# Patient Record
Sex: MALE | Race: WHITE | URBAN - METROPOLITAN AREA
[De-identification: names, ages, dates, MRNs, and addresses within clinical notes are randomized per-mention and may not be internally consistent; named-entity substitution may affect disease eponyms.]

---

## 2024-05-09 ENCOUNTER — TELEPHONE (OUTPATIENT)
Dept: FAMILY MEDICINE CLINIC | Facility: CLINIC | Age: 34
End: 2024-05-09

## 2024-05-09 NOTE — TELEPHONE ENCOUNTER
Shayan Parks (wife) called and is a patient of yours and would like her  to be as well. Is that okay?

## 2024-07-31 ENCOUNTER — TELEPHONE (OUTPATIENT)
Dept: FAMILY MEDICINE CLINIC | Facility: CLINIC | Age: 34
End: 2024-07-31

## 2024-07-31 DIAGNOSIS — Z00.00 ROUTINE GENERAL MEDICAL EXAMINATION AT A HEALTH CARE FACILITY: Primary | ICD-10-CM

## 2024-08-06 ENCOUNTER — OFFICE VISIT (OUTPATIENT)
Dept: FAMILY MEDICINE CLINIC | Facility: CLINIC | Age: 34
End: 2024-08-06
Payer: COMMERCIAL

## 2024-08-06 VITALS
DIASTOLIC BLOOD PRESSURE: 66 MMHG | WEIGHT: 193 LBS | RESPIRATION RATE: 16 BRPM | SYSTOLIC BLOOD PRESSURE: 106 MMHG | BODY MASS INDEX: 25.58 KG/M2 | OXYGEN SATURATION: 100 % | HEART RATE: 71 BPM | HEIGHT: 73 IN

## 2024-08-06 DIAGNOSIS — Q66.6: ICD-10-CM

## 2024-08-06 DIAGNOSIS — Z23 NEED FOR DIPHTHERIA-TETANUS-PERTUSSIS (TDAP) VACCINE: ICD-10-CM

## 2024-08-06 DIAGNOSIS — Z01.84 IMMUNITY STATUS TESTING: ICD-10-CM

## 2024-08-06 DIAGNOSIS — Z00.00 ROUTINE PHYSICAL EXAMINATION: Primary | ICD-10-CM

## 2024-08-06 PROCEDURE — 3008F BODY MASS INDEX DOCD: CPT | Performed by: FAMILY MEDICINE

## 2024-08-06 PROCEDURE — 90715 TDAP VACCINE 7 YRS/> IM: CPT | Performed by: FAMILY MEDICINE

## 2024-08-06 PROCEDURE — 3074F SYST BP LT 130 MM HG: CPT | Performed by: FAMILY MEDICINE

## 2024-08-06 PROCEDURE — 99385 PREV VISIT NEW AGE 18-39: CPT | Performed by: FAMILY MEDICINE

## 2024-08-06 PROCEDURE — 3078F DIAST BP <80 MM HG: CPT | Performed by: FAMILY MEDICINE

## 2024-08-06 PROCEDURE — 90471 IMMUNIZATION ADMIN: CPT | Performed by: FAMILY MEDICINE

## 2024-08-06 RX ORDER — ALBUTEROL SULFATE 90 UG/1
2 AEROSOL, METERED RESPIRATORY (INHALATION) EVERY 6 HOURS PRN
Qty: 8 G | Refills: 0 | Status: SHIPPED | OUTPATIENT
Start: 2024-08-06

## 2024-08-06 NOTE — PATIENT INSTRUCTIONS
Health Maintenance    Return to office (or other lab) for fasting blood work    Health and Safety   Eat healthy well balanced diet - majority should be lean protein and vegetables  Get at least 150 min of exercise per week (30 min/5 days)  Wear sunscreen - SPF 30 or higher and reapply every 80min.   Wear seat belts and drive safely.  Schedule regular appointments with dentist.  Schedule yearly eye exam if you wear glasses/contacts.    Vaccinations   Yearly Flu Vaccine recommended for everyone over the age of 6 months   Tetanus, Diptheria and Pertussis vaccine should be given to adults every 7-10 years.  Adults 50+ are recommended to get shingles vaccine, Shingrix (2 doses  by 2-6 months).  Covid-19 vaccine is recommended. It is safe and effective at decreasing the risk of disease and complications (including need for mechanical ventilation and death)  Pneumonia vaccine (Prevnar 20) is recommended for all adults 65+ and adults under age 65 with chronic medical conditions like lung disease/asthma, diabetes, heart disease.    Colon Cancer screening  The American Cancer Society recommends screening adults 45 and over

## 2024-08-06 NOTE — PROGRESS NOTES
Chief Complaint   Patient presents with    Establish Care    Well Adult    Ankle Pain     Right ankle, pt states he fell a month ago, states was swollen, resolved     Back Pain     Wants to discuss       HPI:  Wellness   Exercise: Yes  Drinks water: Yes  Eat variety of fruits & vegetables, lean meats: Yes  Issues with sleep: No  Regular dental exams: Yes  Change in size/consistency of stool: No  Change in appearance of mole: No      [place] Suicide Severity Rating Scale Screener   In what setting is the screener performed?: an office visit  1. Have you wished you were dead or wished you could go to sleep and not wake up? (past 30 days): No  2. Have you actually had any thoughts of killing yourself? (past 30 days): No  6. Have you ever done anything, started to do anything, or prepared to do anything to end your life? (lifetime): No  Score and Suggested Actions - Office Visit: No Risk  Score and Intervention  Score and Suggested Actions - Office Visit: No Risk    CAGE Alcohol Screening       8/6/2024     3:01 PM   CAGE Flowsheet   Have you ever felt you should Cut down on your drinking? 1   Have people Annoyed you by criticizing your drinking? 0   Have you ever felt bad or Guilty about your drinking? 1   Have you ever had a drink first thing in the morning to steady your nerves or to get rid of a hangover (Eye opener)? 0   Total Score: Item responses on the CAGE are scored 0 or 1, with a higher score an indication of alcohol 2   Total Score: Item responses on the CAGE are scored 0 or 1, with a higher score an indication of alcohol 2 - Clinically Significant        Depression Screening (PHQ-2/PHQ-9): Over the LAST 2 WEEKS   Little interest or pleasure in doing things: Several days    Feeling down, depressed, or hopeless: Not at all    PHQ-2 SCORE: 1        Also wants to discuss     Back pain   Saw chiropractor - getting better   Had rib misaligment from tightness     Ankle sprain   Improving          Review of  Systems   Review of Systems   Constitutional:  Negative for chills, fever and malaise/fatigue.   HENT:  Negative for congestion, ear pain and sore throat.    Eyes:  Negative for blurred vision, double vision and pain.   Respiratory:  Negative for cough, shortness of breath and wheezing.    Cardiovascular:  Negative for chest pain, palpitations and leg swelling.   Gastrointestinal:  Negative for abdominal pain, blood in stool, constipation, diarrhea, melena, nausea and vomiting.   Genitourinary:  Negative for dysuria, flank pain and frequency.   Musculoskeletal:  Negative for myalgias and neck pain.   Skin:  Negative for itching and rash.   Neurological:  Negative for dizziness, weakness and headaches.   Endo/Heme/Allergies:  Negative for environmental allergies and polydipsia. Does not bruise/bleed easily.   Psychiatric/Behavioral:  Negative for depression. The patient is not nervous/anxious and does not have insomnia.        There is no problem list on file for this patient.      Past Medical History:    Asthma (HCC)     Past Surgical History:   Procedure Laterality Date    Other surgical history      left index finger, removal of bone    [place] teeth removed      all 4     Family History   Problem Relation Age of Onset    Depression Mother     Anxiety Mother     Cancer Father         lung    Heart Attack Father 55    Thyroid Disorder Father     Cancer Paternal Grandmother 65        hodgkin lymphoma    Thyroid Disorder Paternal Grandmother     Dementia Paternal Grandmother      Social History     Socioeconomic History    Marital status: Single   Tobacco Use    Smoking status: Former     Current packs/day: 0.00     Types: Cigarettes     Quit date:      Years since quittin.6    Smokeless tobacco: Never   Vaping Use    Vaping status: Every Day    Substances: Nicotine    Devices: Disposable   Substance and Sexual Activity    Alcohol use: Yes     Alcohol/week: 20.0 standard drinks of alcohol     Types: 10 Cans  of beer, 10 Shots of liquor per week    Drug use: Never       Current Outpatient Medications   Medication Sig Dispense Refill    Albuterol Sulfate 108 (90 Base) MCG/ACT Inhalation Aerosol Powder, Breath Activated Inhale into the lungs.      albuterol 108 (90 Base) MCG/ACT Inhalation Aero Soln Inhale 2 puffs into the lungs every 6 (six) hours as needed for Wheezing. 8 g 0       Allergies   Allergen Reactions    Seasonal OTHER (SEE COMMENTS)     Itching eyes, runny nose       Immunizations:   Immunization History   Administered Date(s) Administered    >=3 YRS TRI  MULTIDOSE VIAL (24518) FLU CLINIC 09/05/2017    Covid-19 Vaccine Pfizer 30 mcg/0.3 ml 03/26/2021, 04/16/2021    TDAP 07/13/2012   Pended Date(s) Pended    TDAP 08/06/2024       Physical Exam  /66   Pulse 71   Resp 16   Ht 6' 1\" (1.854 m)   Wt 193 lb (87.5 kg)   SpO2 100%   BMI 25.46 kg/m²   Physical Exam  Constitutional:       General: He is not in acute distress.     Appearance: Normal appearance.   HENT:      Right Ear: Tympanic membrane normal.      Left Ear: Tympanic membrane normal.      Mouth/Throat:      Mouth: Mucous membranes are moist.      Pharynx: Oropharynx is clear. No posterior oropharyngeal erythema.   Eyes:      Extraocular Movements: Extraocular movements intact.      Conjunctiva/sclera: Conjunctivae normal.      Pupils: Pupils are equal, round, and reactive to light.   Cardiovascular:      Rate and Rhythm: Normal rate and regular rhythm.      Pulses: Normal pulses.      Heart sounds: Normal heart sounds.   Pulmonary:      Effort: Pulmonary effort is normal.      Breath sounds: Normal breath sounds. No wheezing or rhonchi.   Abdominal:      General: Abdomen is flat. Bowel sounds are normal.      Palpations: Abdomen is soft.      Tenderness: There is no abdominal tenderness. There is no guarding.   Musculoskeletal:         General: No swelling, tenderness, deformity or signs of injury.      Cervical back: Neck supple.    Lymphadenopathy:      Cervical: No cervical adenopathy.   Skin:     General: Skin is warm and dry.      Findings: No rash.   Neurological:      General: No focal deficit present.      Mental Status: He is alert and oriented to person, place, and time.      Motor: No weakness.   Psychiatric:         Mood and Affect: Mood normal.         Behavior: Behavior normal.         Thought Content: Thought content normal.         A/P:    1. Routine physical examination    2. Need for diphtheria-tetanus-pertussis (Tdap) vaccine  - TETANUS, DIPHTHERIA TOXOIDS AND ACELLULAR PERTUSIS VACCINE (TDAP), >7 YEARS, IM USE    3. Immunity status testing  - Hepatitis B Surface Antibody [E]; Future    4. Congenital hallux valgus  Refer to podiatry when ready          Health Maintenance   Topic Date Due    Annual Physical  Never done    DTaP,Tdap,and Td Vaccines (2 - Td or Tdap) 07/13/2022    COVID-19 Vaccine (3 - 2023-24 season) 09/01/2023    Tobacco Cessation Counseling  Never done    Influenza Vaccine (1) 10/01/2024    Annual Depression Screening  Completed    Pneumococcal Vaccine: Birth to 64yrs  Aged Out       Meds & Refills for this Visit:  Requested Prescriptions     Signed Prescriptions Disp Refills    albuterol 108 (90 Base) MCG/ACT Inhalation Aero Soln 8 g 0     Sig: Inhale 2 puffs into the lungs every 6 (six) hours as needed for Wheezing.       Imaging & Consults:  TETANUS, DIPHTHERIA TOXOIDS AND ACELLULAR PERTUSIS VACCINE (TDAP), >7 YEARS, IM USE      Return in about 1 year (around 8/6/2025) for annual physical.      Patient Instructions   Health Maintenance    Return to office (or other lab) for fasting blood work    Health and Safety   Eat healthy well balanced diet - majority should be lean protein and vegetables  Get at least 150 min of exercise per week (30 min/5 days)  Wear sunscreen - SPF 30 or higher and reapply every 80min.   Wear seat belts and drive safely.  Schedule regular appointments with dentist.  Schedule yearly  eye exam if you wear glasses/contacts.    Vaccinations   Yearly Flu Vaccine recommended for everyone over the age of 6 months   Tetanus, Diptheria and Pertussis vaccine should be given to adults every 7-10 years.  Adults 50+ are recommended to get shingles vaccine, Shingrix (2 doses  by 2-6 months).  Covid-19 vaccine is recommended. It is safe and effective at decreasing the risk of disease and complications (including need for mechanical ventilation and death)  Pneumonia vaccine (Prevnar 20) is recommended for all adults 65+ and adults under age 65 with chronic medical conditions like lung disease/asthma, diabetes, heart disease.    Colon Cancer screening  The American Cancer Society recommends screening adults 45 and over

## 2024-09-09 ENCOUNTER — LAB ENCOUNTER (OUTPATIENT)
Dept: LAB | Age: 34
End: 2024-09-09
Attending: FAMILY MEDICINE
Payer: COMMERCIAL

## 2024-09-09 DIAGNOSIS — Z01.84 IMMUNITY STATUS TESTING: ICD-10-CM

## 2024-09-09 DIAGNOSIS — Z00.00 ROUTINE GENERAL MEDICAL EXAMINATION AT A HEALTH CARE FACILITY: ICD-10-CM

## 2024-09-09 LAB
ALBUMIN SERPL-MCNC: 4.6 G/DL (ref 3.2–4.8)
ALBUMIN/GLOB SERPL: 1.6 {RATIO} (ref 1–2)
ALP LIVER SERPL-CCNC: 57 U/L
ALT SERPL-CCNC: 22 U/L
ANION GAP SERPL CALC-SCNC: 10 MMOL/L (ref 0–18)
AST SERPL-CCNC: 18 U/L (ref ?–34)
BASOPHILS # BLD AUTO: 0.03 X10(3) UL (ref 0–0.2)
BASOPHILS NFR BLD AUTO: 0.5 %
BILIRUB SERPL-MCNC: 0.9 MG/DL (ref 0.3–1.2)
BUN BLD-MCNC: 7 MG/DL (ref 9–23)
CALCIUM BLD-MCNC: 9.8 MG/DL (ref 8.7–10.4)
CHLORIDE SERPL-SCNC: 104 MMOL/L (ref 98–112)
CHOLEST SERPL-MCNC: 177 MG/DL (ref ?–200)
CO2 SERPL-SCNC: 21 MMOL/L (ref 21–32)
CREAT BLD-MCNC: 0.83 MG/DL
EGFRCR SERPLBLD CKD-EPI 2021: 116 ML/MIN/1.73M2 (ref 60–?)
EOSINOPHIL # BLD AUTO: 0.53 X10(3) UL (ref 0–0.7)
EOSINOPHIL NFR BLD AUTO: 8.6 %
ERYTHROCYTE [DISTWIDTH] IN BLOOD BY AUTOMATED COUNT: 11.6 %
FASTING PATIENT LIPID ANSWER: YES
FASTING STATUS PATIENT QL REPORTED: YES
GLOBULIN PLAS-MCNC: 2.9 G/DL (ref 2–3.5)
GLUCOSE BLD-MCNC: 77 MG/DL (ref 70–99)
HBV SURFACE AB SER QL: NONREACTIVE
HBV SURFACE AB SERPL IA-ACNC: <3.1 MIU/ML
HCT VFR BLD AUTO: 46.8 %
HDLC SERPL-MCNC: 51 MG/DL (ref 40–59)
HGB BLD-MCNC: 16.3 G/DL
IMM GRANULOCYTES # BLD AUTO: 0.04 X10(3) UL (ref 0–1)
IMM GRANULOCYTES NFR BLD: 0.6 %
LDLC SERPL CALC-MCNC: 103 MG/DL (ref ?–100)
LYMPHOCYTES # BLD AUTO: 1.83 X10(3) UL (ref 1–4)
LYMPHOCYTES NFR BLD AUTO: 29.6 %
MCH RBC QN AUTO: 33.1 PG (ref 26–34)
MCHC RBC AUTO-ENTMCNC: 34.8 G/DL (ref 31–37)
MCV RBC AUTO: 95.1 FL
MONOCYTES # BLD AUTO: 0.5 X10(3) UL (ref 0.1–1)
MONOCYTES NFR BLD AUTO: 8.1 %
NEUTROPHILS # BLD AUTO: 3.25 X10 (3) UL (ref 1.5–7.7)
NEUTROPHILS # BLD AUTO: 3.25 X10(3) UL (ref 1.5–7.7)
NEUTROPHILS NFR BLD AUTO: 52.6 %
NONHDLC SERPL-MCNC: 126 MG/DL (ref ?–130)
OSMOLALITY SERPL CALC.SUM OF ELEC: 277 MOSM/KG (ref 275–295)
PLATELET # BLD AUTO: 248 10(3)UL (ref 150–450)
POTASSIUM SERPL-SCNC: 3.8 MMOL/L (ref 3.5–5.1)
PROT SERPL-MCNC: 7.5 G/DL (ref 5.7–8.2)
RBC # BLD AUTO: 4.92 X10(6)UL
SODIUM SERPL-SCNC: 135 MMOL/L (ref 136–145)
TRIGL SERPL-MCNC: 127 MG/DL (ref 30–149)
TSI SER-ACNC: 1.65 MIU/ML (ref 0.55–4.78)
VLDLC SERPL CALC-MCNC: 21 MG/DL (ref 0–30)
WBC # BLD AUTO: 6.2 X10(3) UL (ref 4–11)

## 2024-09-09 PROCEDURE — 80061 LIPID PANEL: CPT

## 2024-09-09 PROCEDURE — 84443 ASSAY THYROID STIM HORMONE: CPT

## 2024-09-09 PROCEDURE — 85025 COMPLETE CBC W/AUTO DIFF WBC: CPT

## 2024-09-09 PROCEDURE — 86706 HEP B SURFACE ANTIBODY: CPT

## 2024-09-09 PROCEDURE — 80053 COMPREHEN METABOLIC PANEL: CPT

## 2024-12-12 NOTE — TELEPHONE ENCOUNTER
Medication(s) to Refill:   Requested Prescriptions     Pending Prescriptions Disp Refills    ALBUTEROL 108 (90 Base) MCG/ACT Inhalation Aero Soln [Pharmacy Med Name: ALBUTEROL HFA INH (200 PUFFS) 8.5GM] 8.5 g 0     Sig: INHALE 2 PUFFS BY MOUTH INTO THE LUNGS EVERY 6 HOURS AS NEEDED FOR WHEEZING         Reason for Medication Refill being sent to Provider / Reason Protocol Failed:  [] 90 day refill has already been granted  [] Blood Pressure out of range  [] Labs Abnormal/over due  [] Medication not previously prescribed by Provider  [] Non-Protocol Medication  [] Controlled Substance   [] Due for appointment- no future appointment scheduled  [] No Follow up specified      Last Time Medication was Filled: 8/6/24       Last Office Visit with PCP: 8/6/24    When Patient was Due Back to the Office:    (from when PCP last addressed condition)  1 year  Future Appointments:  No future appointments.      Last Blood Pressures:  BP Readings from Last 2 Encounters:   08/06/24 106/66         Recent Labs:        Action taken:  [] Refill approved per protocol  [] Routing to provider for approval

## 2024-12-13 ENCOUNTER — APPOINTMENT (OUTPATIENT)
Dept: GENERAL RADIOLOGY | Age: 34
End: 2024-12-13
Attending: NURSE PRACTITIONER
Payer: COMMERCIAL

## 2024-12-13 ENCOUNTER — HOSPITAL ENCOUNTER (OUTPATIENT)
Age: 34
Discharge: HOME | End: 2024-12-13
Payer: COMMERCIAL

## 2024-12-13 VITALS
DIASTOLIC BLOOD PRESSURE: 83 MMHG | SYSTOLIC BLOOD PRESSURE: 114 MMHG | RESPIRATION RATE: 16 BRPM | WEIGHT: 190 LBS | HEIGHT: 74 IN | HEART RATE: 69 BPM | OXYGEN SATURATION: 100 % | BODY MASS INDEX: 24.38 KG/M2 | TEMPERATURE: 98 F

## 2024-12-13 DIAGNOSIS — R07.81 RIB PAIN ON LEFT SIDE: Primary | ICD-10-CM

## 2024-12-13 PROCEDURE — 71101 X-RAY EXAM UNILAT RIBS/CHEST: CPT | Performed by: NURSE PRACTITIONER

## 2024-12-13 PROCEDURE — 99204 OFFICE O/P NEW MOD 45 MIN: CPT

## 2024-12-13 PROCEDURE — 99213 OFFICE O/P EST LOW 20 MIN: CPT

## 2024-12-13 RX ORDER — NAPROXEN 500 MG/1
500 TABLET ORAL 2 TIMES DAILY PRN
Qty: 14 TABLET | Refills: 0 | Status: SHIPPED | OUTPATIENT
Start: 2024-12-13 | End: 2024-12-20

## 2024-12-13 RX ORDER — ORPHENADRINE CITRATE 100 MG/1
100 TABLET ORAL 2 TIMES DAILY
Qty: 14 TABLET | Refills: 0 | Status: SHIPPED | OUTPATIENT
Start: 2024-12-13 | End: 2024-12-20

## 2024-12-13 RX ORDER — ALBUTEROL SULFATE 90 UG/1
2 INHALANT RESPIRATORY (INHALATION) EVERY 6 HOURS PRN
Qty: 8.5 G | Refills: 0 | Status: SHIPPED | OUTPATIENT
Start: 2024-12-13

## 2024-12-13 NOTE — ED PROVIDER NOTES
Patient Seen in: [facility]      History     Chief Complaint   Patient presents with    Pain     Stated Complaint: Rib pain    Subjective:   HPI  34-year-old male presents complaining of left lateral and anterior rib pain.  He states on Wednesday his friend lifted him up to give him a bear hug when his arm was rested at his side and since then he has had pain to this area.    Objective:     No pertinent past medical history.            No pertinent past surgical history.              No pertinent social history.            Review of Systems   All other systems reviewed and are negative.      Positive for stated complaint: Rib pain  Other systems are as noted in HPI.  Constitutional and vital signs reviewed.      All other systems reviewed and negative except as noted above.    Physical Exam     ED Triage Vitals [12/13/24 1059]   /83   Pulse 69   Resp 16   Temp 98.2 °F (36.8 °C)   Temp src Oral   SpO2 100 %   O2 Device None (Room air)       Current Vitals:   Vital Signs  BP: 114/83  Pulse: 69  Resp: 16  Temp: 98.2 °F (36.8 °C)  Temp src: Oral    Oxygen Therapy  SpO2: 100 %  O2 Device: None (Room air)        Physical Exam  Vitals and nursing note reviewed.   Constitutional:       General: He is not in acute distress.     Appearance: He is well-developed. He is not ill-appearing or toxic-appearing.   Cardiovascular:      Rate and Rhythm: Normal rate and regular rhythm.      Heart sounds: Normal heart sounds.   Pulmonary:      Effort: Pulmonary effort is normal.      Breath sounds: Normal breath sounds.   Chest:      Chest wall: Tenderness (Left lateral anterior chest tenderness with no crepitus or flail chest) present.   Skin:     General: Skin is warm and dry.   Neurological:      Mental Status: He is alert and oriented to person, place, and time.             ED Course   Labs Reviewed - No data to display       Would like toXR RIBS WITH CHEST (3 VIEWS), LEFT  (CPT=71101)    Result Date:  12/13/2024  PROCEDURE:  XR RIBS WITH CHEST (3 VIEWS), LEFT  (CPT=71101)  TECHNIQUE:  PA Chest and three views of the ribs were obtained  COMPARISON:  None.  INDICATIONS:  Rib pain  PATIENT STATED HISTORY: (As transcribed by Technologist)  Patient states that when his friend gave him a hug, he heard a \"popping\" sound and felt pain in his left ribs. Patient has had left anterior rib pain since 12/11/24.    FINDINGS:  Normal heart size and pulmonary vascularity.  Clear lungs.  No pneumothorax.  No left rib fracture or osseous abnormality.            CONCLUSION:  No abnormality demonstrated in the chest and left ribs.   LOCATION:  [facility]     Dictated by (CST): Brown Manzo MD on 12/13/2024 at 12:05 PM     Finalized by (CST): Brown Manzo MD on 12/13/2024 at 12:06 PM             MDM     Medical Decision Making  34-year-old male presents complaining of left lateral and anterior rib pain.  He states on Wednesday his friend lifted him up to give him a bear hug when his arm was rested at his side and since then he has had pain to this area.    Pertinent Labs & Imaging studies reviewed. (See chart for details).  Patient coming in with rib pain.   Differential diagnosis includes but not limited to rib pain, rib fracture, pneumothorax  Radiology Rib with chest with no acute abnormality.  Will treat for  Rib pain on L side.  Will discharge on tylenol/motrin prn. Patient/Parent is comfortable with this plan.    Overall Pt looks good. Non-toxic, well-hydrated and in no respiratory distress. Vital signs are reassuring. Exam is reassuring. I do not believe pt requires and additional diagnostic studies or intervention. I believe pt can be discharged home to continue evaluation as an outpatient. Follow-up provider given. Discharge instructions given and reviewed. Return for any problems. All understand and agree with the plan.        Problems Addressed:  Rib pain on left side: acute illness or injury    Amount and/or Complexity of  Data Reviewed  Radiology: ordered and independent interpretation performed.     Details: Rib with chest with no rib fracture ordered and interpreted by myself         Disposition and Plan     Clinical Impression:  1. Rib pain on left side         Disposition:  Discharge  12/13/2024 12:17 pm    Follow-up:  No follow-up provider specified.        Medications Prescribed:  Discharge Medication List as of 12/13/2024 12:18 PM        START taking these medications    Details   naproxen 500 MG Oral Tab Take 1 tablet (500 mg total) by mouth 2 (two) times daily as needed., Normal, Disp-14 tablet, R-0      orphenadrine  MG Oral Tablet 12 Hr Take 100 mg by mouth 2 (two) times daily for 7 days., Normal, Disp-14 tablet, R-0                 Supplementary Documentation:

## 2024-12-13 NOTE — ED INITIAL ASSESSMENT (HPI)
Pts friend gave him bear hug and lifted him up on Wednesday and pt states he felt pain and 'popping' to left ribs. Pt concerned for rib fx. Not taking anything for pain.